# Patient Record
Sex: FEMALE | Race: WHITE
[De-identification: names, ages, dates, MRNs, and addresses within clinical notes are randomized per-mention and may not be internally consistent; named-entity substitution may affect disease eponyms.]

---

## 2022-11-25 ENCOUNTER — HOSPITAL ENCOUNTER (EMERGENCY)
Dept: HOSPITAL 76 - ED | Age: 87
Discharge: HOME | End: 2022-11-25
Payer: MEDICARE

## 2022-11-25 VITALS — DIASTOLIC BLOOD PRESSURE: 71 MMHG | SYSTOLIC BLOOD PRESSURE: 136 MMHG

## 2022-11-25 DIAGNOSIS — I49.1: ICD-10-CM

## 2022-11-25 DIAGNOSIS — I44.7: ICD-10-CM

## 2022-11-25 DIAGNOSIS — K52.9: ICD-10-CM

## 2022-11-25 DIAGNOSIS — K29.71: Primary | ICD-10-CM

## 2022-11-25 DIAGNOSIS — K92.0: ICD-10-CM

## 2022-11-25 DIAGNOSIS — R79.89: ICD-10-CM

## 2022-11-25 LAB
ALBUMIN DIAFP-MCNC: 4.2 G/DL (ref 3.2–5.5)
ALBUMIN/GLOB SERPL: 1.2 {RATIO} (ref 1–2.2)
ALP SERPL-CCNC: 75 IU/L (ref 42–121)
ALT SERPL W P-5'-P-CCNC: 18 IU/L (ref 10–60)
ANION GAP SERPL CALCULATED.4IONS-SCNC: 13 MMOL/L (ref 6–13)
AST SERPL W P-5'-P-CCNC: 27 IU/L (ref 10–42)
BASOPHILS NFR BLD AUTO: 0 10^3/UL (ref 0–0.1)
BASOPHILS NFR BLD AUTO: 0.2 %
BILIRUB BLD-MCNC: 0.7 MG/DL (ref 0.2–1)
BUN SERPL-MCNC: 34 MG/DL (ref 6–20)
CALCIUM UR-MCNC: 9.9 MG/DL (ref 8.5–10.3)
CHLORIDE SERPL-SCNC: 101 MMOL/L (ref 101–111)
CLARITY UR REFRACT.AUTO: (no result)
CO2 SERPL-SCNC: 25 MMOL/L (ref 21–32)
CREAT SERPLBLD-SCNC: 0.9 MG/DL (ref 0.4–1)
EOSINOPHIL # BLD AUTO: 0 10^3/UL (ref 0–0.7)
EOSINOPHIL NFR BLD AUTO: 0.1 %
ERYTHROCYTE [DISTWIDTH] IN BLOOD BY AUTOMATED COUNT: 12.1 % (ref 12–15)
GASTROCULT GAST QL: POSITIVE
GFRSERPLBLD MDRD-ARVRAT: 59 ML/MIN/{1.73_M2} (ref 89–?)
GLOBULIN SER-MCNC: 3.4 G/DL (ref 2.1–4.2)
GLUCOSE SERPL-MCNC: 126 MG/DL (ref 70–100)
GLUCOSE UR QL STRIP.AUTO: NEGATIVE MG/DL
HCT VFR BLD AUTO: 42.9 % (ref 37–47)
HCT VFR BLD AUTO: 46.9 % (ref 37–47)
HGB UR QL STRIP: 13.9 G/DL (ref 12–16)
HGB UR QL STRIP: 15.1 G/DL (ref 12–16)
KETONES UR QL STRIP.AUTO: NEGATIVE MG/DL
LIPASE SERPL-CCNC: 52 U/L (ref 22–51)
LYMPHOCYTES # SPEC AUTO: 1 10^3/UL (ref 1.5–3.5)
LYMPHOCYTES NFR BLD AUTO: 7.4 %
MCH RBC QN AUTO: 29.8 PG (ref 27–31)
MCHC RBC AUTO-ENTMCNC: 32.2 G/DL (ref 32–36)
MCV RBC AUTO: 92.5 FL (ref 81–99)
MONOCYTES # BLD AUTO: 0.5 10^3/UL (ref 0–1)
MONOCYTES NFR BLD AUTO: 4.1 %
NEUTROPHILS # BLD AUTO: 11.3 10^3/UL (ref 1.5–6.6)
NEUTROPHILS # SNV AUTO: 12.8 X10^3/UL (ref 4.8–10.8)
NEUTROPHILS NFR BLD AUTO: 87.9 %
NITRITE UR QL STRIP.AUTO: POSITIVE
NRBC # BLD AUTO: 0 /100WBC
NRBC # BLD AUTO: 0 X10^3/UL
PDW BLD AUTO: 9.5 FL (ref 7.9–10.8)
PH UR STRIP.AUTO: 6 PH (ref 5–7.5)
PLATELET # BLD: 328 10^3/UL (ref 130–450)
POTASSIUM SERPL-SCNC: 4.4 MMOL/L (ref 3.5–5)
PROT SPEC-MCNC: 7.6 G/DL (ref 6.7–8.2)
PROT UR STRIP.AUTO-MCNC: (no result) MG/DL
RBC # UR STRIP.AUTO: NEGATIVE /UL
RBC # URNS HPF: (no result) /HPF (ref 0–5)
RBC MAR: 5.07 10^6/UL (ref 4.2–5.4)
SODIUM SERPLBLD-SCNC: 139 MMOL/L (ref 135–145)
SP GR UR STRIP.AUTO: 1.02 (ref 1–1.03)
SQUAMOUS URNS QL MICRO: (no result)
UROBILINOGEN UR QL STRIP.AUTO: (no result) E.U./DL
UROBILINOGEN UR STRIP.AUTO-MCNC: NEGATIVE MG/DL

## 2022-11-25 PROCEDURE — 80053 COMPREHEN METABOLIC PANEL: CPT

## 2022-11-25 PROCEDURE — 96375 TX/PRO/DX INJ NEW DRUG ADDON: CPT

## 2022-11-25 PROCEDURE — 99283 EMERGENCY DEPT VISIT LOW MDM: CPT

## 2022-11-25 PROCEDURE — 36415 COLL VENOUS BLD VENIPUNCTURE: CPT

## 2022-11-25 PROCEDURE — 81003 URINALYSIS AUTO W/O SCOPE: CPT

## 2022-11-25 PROCEDURE — 83690 ASSAY OF LIPASE: CPT

## 2022-11-25 PROCEDURE — 93005 ELECTROCARDIOGRAM TRACING: CPT

## 2022-11-25 PROCEDURE — 85018 HEMOGLOBIN: CPT

## 2022-11-25 PROCEDURE — 87077 CULTURE AEROBIC IDENTIFY: CPT

## 2022-11-25 PROCEDURE — 87181 SC STD AGAR DILUTION PER AGT: CPT

## 2022-11-25 PROCEDURE — 96365 THER/PROPH/DIAG IV INF INIT: CPT

## 2022-11-25 PROCEDURE — 85025 COMPLETE CBC W/AUTO DIFF WBC: CPT

## 2022-11-25 PROCEDURE — 85014 HEMATOCRIT: CPT

## 2022-11-25 PROCEDURE — 87086 URINE CULTURE/COLONY COUNT: CPT

## 2022-11-25 PROCEDURE — 81001 URINALYSIS AUTO W/SCOPE: CPT

## 2022-11-25 NOTE — ED PHYSICIAN DOCUMENTATION
History of Present Illness





- Stated complaint


Stated Complaint: NVD





- Chief complaint


Chief Complaint: Abd Pain





- History obtained from


History obtained from: Patient, Family





- Additonal information


Additional information: 





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 

healthy presents with an acute illness starting last night with epigastric pain,

black vomit, and diarrhea.  No sick contacts.  She did not eat anything that she

thinks would have caused this.  No history of GI bleeding.  Note made that on 

exam she has an irregularly irregular heart rhythm, she does not have a history 

that she knows of of IZABEL conrad.  She is here with her  and son.





Review of Systems


Ten Systems: 10 systems reviewed and negative


Constitutional: denies: Fever, Chills, Fatigue


Cardiac: denies: Chest pain / pressure, Palpitations


Respiratory: denies: Dyspnea, Cough


GI: reports: Abdominal Pain, Nausea, Vomiting, Diarrhea.  denies: Bloody / black

stool





PD PAST MEDICAL HISTORY





- Present Medications


Home Medications: 


                                Ambulatory Orders











 Medication  Instructions  Recorded  Confirmed


 


Nitrofurantoin [Macrobid] 1 cap PO BID #10 cap 11/25/22 


 


Omeprazole 40 mg PO DAILY #30 cap 11/25/22 


 


Ondansetron Odt [Zofran] 4 mg TL Q6H PRN #10 tablet 11/25/22 














- Allergies


Allergies/Adverse Reactions: 


                                    Allergies











Allergy/AdvReac Type Severity Reaction Status Date / Time


 


No Known Drug Allergies Allergy   Verified 11/25/22 12:22














PD ED PE NORMAL





- Vitals


Vital signs reviewed: Yes





- General


General: Alert and oriented X 3, No acute distress





- HEENT


HEENT: PERRL, EOMI





- Neck


Neck: Supple, no meningeal sign, No bony TTP





- Cardiac


Cardiac: Other (Irregularly irregular without murmur)





- Respiratory


Respiratory: No respiratory distress, Clear bilaterally





- Abdomen


Abdomen: Normal bowel sounds, Soft, Non tender





- Back


Back: No CVA TTP, No spinal TTP





- Derm


Derm: Normal color, Warm and dry





- Extremities


Extremities: No edema, No calf tenderness / cord





- Neuro


Neuro: Alert and oriented X 3, Normal speech





Results





- Vitals


Vitals: 


                               Vital Signs - 24 hr











  11/25/22 11/25/22 11/25/22





  12:20 14:22 16:00


 


Temperature 36.5 C  


 


Heart Rate 48 L 87 86


 


Respiratory 18 23 22





Rate   


 


Blood Pressure 154/95 H 155/85 H 141/70 H


 


O2 Saturation 96 97 97








                                     Oxygen











O2 Source                      Room air

















- EKG (time done)


  ** 100


Rate: Rate (enter#)


Rhythm: Sinus tachycardia (Frequent PACs)


Intervals: LBBB


Ischemia: ST elevation c/w repol





- Labs


Labs: 


                                Laboratory Tests











  11/25/22 11/25/22 11/25/22





  12:30 12:30 15:09


 


WBC  12.8 H  


 


RBC  5.07  


 


Hgb  15.1  


 


Hct  46.9  


 


MCV  92.5  


 


MCH  29.8  


 


MCHC  32.2  


 


RDW  12.1  


 


Plt Count  328  


 


MPV  9.5  


 


Neut # (Auto)  11.3 H  


 


Lymph # (Auto)  1.0 L  


 


Mono # (Auto)  0.5  


 


Eos # (Auto)  0.0  


 


Baso # (Auto)  0.0  


 


Absolute Nucleated RBC  0.00  


 


Nucleated RBC %  0.0  


 


Sodium   139 


 


Potassium   4.4 


 


Chloride   101 


 


Carbon Dioxide   25 


 


Anion Gap   13.0 


 


BUN   34 H 


 


Creatinine   0.9 


 


Estimated GFR (MDRD)   59 L 


 


Glucose   126 H 


 


Calcium   9.9 


 


Total Bilirubin   0.7 


 


AST   27 


 


ALT   18 


 


Alkaline Phosphatase   75 


 


Total Protein   7.6 


 


Albumin   4.2 


 


Globulin   3.4 


 


Albumin/Globulin Ratio   1.2 


 


Lipase   52 H 


 


Urine Color    YELLOW


 


Urine Clarity    HAZY


 


Urine pH    6.0


 


Ur Specific Gravity    1.025


 


Urine Protein    TRACE


 


Urine Glucose (UA)    NEGATIVE


 


Urine Ketones    NEGATIVE


 


Urine Occult Blood    NEGATIVE


 


Urine Nitrite    POSITIVE H


 


Urine Bilirubin    NEGATIVE


 


Urine Urobilinogen    0.2 (NORMAL)


 


Ur Leukocyte Esterase    TRACE H


 


Urine RBC    0-5


 


Urine WBC    11-25 H


 


Ur Squamous Epith Cells    RARE Squamous


 


Urine Bacteria    Many H


 


Ur Microscopic Review    INDICATED


 


Urine Culture Comments    INDICATED


 


Gastric Fluid pH   


 


Gastric Occult Blood   














  11/25/22 11/25/22





  15:52 16:12


 


WBC  


 


RBC  


 


Hgb   13.9


 


Hct   42.9


 


MCV  


 


MCH  


 


MCHC  


 


RDW  


 


Plt Count  


 


MPV  


 


Neut # (Auto)  


 


Lymph # (Auto)  


 


Mono # (Auto)  


 


Eos # (Auto)  


 


Baso # (Auto)  


 


Absolute Nucleated RBC  


 


Nucleated RBC %  


 


Sodium  


 


Potassium  


 


Chloride  


 


Carbon Dioxide  


 


Anion Gap  


 


BUN  


 


Creatinine  


 


Estimated GFR (MDRD)  


 


Glucose  


 


Calcium  


 


Total Bilirubin  


 


AST  


 


ALT  


 


Alkaline Phosphatase  


 


Total Protein  


 


Albumin  


 


Globulin  


 


Albumin/Globulin Ratio  


 


Lipase  


 


Urine Color  


 


Urine Clarity  


 


Urine pH  


 


Ur Specific Gravity  


 


Urine Protein  


 


Urine Glucose (UA)  


 


Urine Ketones  


 


Urine Occult Blood  


 


Urine Nitrite  


 


Urine Bilirubin  


 


Urine Urobilinogen  


 


Ur Leukocyte Esterase  


 


Urine RBC  


 


Urine WBC  


 


Ur Squamous Epith Cells  


 


Urine Bacteria  


 


Ur Microscopic Review  


 


Urine Culture Comments  


 


Gastric Fluid pH  4.0 


 


Gastric Occult Blood  POSITIVE A 














PD MEDICAL DECISION MAKING





- ED course


ED course: 





87-year-old woman presents with apparent gastroenteritis, she does have some 

blood in her vomit but good H&H initially and only a minor drop after IV fluid 

resuscitation here.  She felt much better after some Maalox and Zofran.  Passed 

a p.o. challenge here.





Departure





- Departure


Disposition: 01 Home, Self Care


Clinical Impression: 


 Gastroenteritis, Gastritis





Condition: Good


Record reviewed to determine appropriate education?: Yes


Instructions:  ED Gastritis, ED Gastroenteritis Viral


Prescriptions: 


Nitrofurantoin [Macrobid] 1 cap PO BID #10 cap


Omeprazole 40 mg PO DAILY #30 cap


Ondansetron Odt [Zofran] 4 mg TL Q6H PRN #10 tablet


 PRN Reason: Nausea / Vomiting


Comments: 


You were seen today for apparent gastroenteritis, with some gastritis causing 

some upper GI bleeding.  Your initial hemoglobin was 15.1, this did drift down a

 bit with IV fluids but just down to 13.9.  As such I do not think you have any 

dangerous bleeding but we are starting you on a proton pump inhibitor and 

received an injection of Protonix here to decrease stomach acid.  You had a mild

 elevation in the white count which might be from a urinary infection which I am

 treating and you showed evidence of what we call prerenal azotemia with 

elevated BUN and normal creatinine.  I am sending prescriptions for nausea 

medicine, antacid medicine, and an antibiotic to [facility] in [place].





Call your doctor to arrange a follow-up appointment, make the next available 

appointment.  In the interim, return anytime if worse or if new symptoms 

develop.





We will culture your urine, the results should be done in 48-72 hours.  If an 

antibiotic change is necessary we will call you.  Return if worse in the 

meantime, especially if you develop increasing flank pain, fevers, or cannot 

keep down the medication.


Forms:  Activity restrictions